# Patient Record
Sex: FEMALE | URBAN - METROPOLITAN AREA
[De-identification: names, ages, dates, MRNs, and addresses within clinical notes are randomized per-mention and may not be internally consistent; named-entity substitution may affect disease eponyms.]

---

## 2019-05-31 ENCOUNTER — DOCUMENTATION ONLY (OUTPATIENT)
Dept: CARDIOLOGY | Facility: CLINIC | Age: 79
End: 2019-05-31

## 2019-05-31 NOTE — PROGRESS NOTES
Two TTE disks from 206 and 2017 received with [facility] report.  Will not play. Sent by patients family.  No contact info but return address on heart page.      Report:  Aortic dissection, chronic.  Moderate AI, mild to moderate MR. Normal EF.